# Patient Record
Sex: MALE | URBAN - METROPOLITAN AREA
[De-identification: names, ages, dates, MRNs, and addresses within clinical notes are randomized per-mention and may not be internally consistent; named-entity substitution may affect disease eponyms.]

---

## 2021-11-30 ENCOUNTER — OFFICE VISIT (OUTPATIENT)
Dept: URBAN - METROPOLITAN AREA CLINIC 8 | Facility: CLINIC | Age: 71
End: 2021-11-30
Payer: COMMERCIAL

## 2021-11-30 DIAGNOSIS — Z96.1 PRESENCE OF INTRAOCULAR LENS: ICD-10-CM

## 2021-11-30 DIAGNOSIS — E11.9 DIABETES MELLITUS TYPE 2 WITHOUT MENTION OF COMPLICATION: Primary | ICD-10-CM

## 2021-11-30 DIAGNOSIS — H04.123 DRY EYE SYNDROME OF BILATERAL LACRIMAL GLANDS: ICD-10-CM

## 2021-11-30 PROCEDURE — 99204 OFFICE O/P NEW MOD 45 MIN: CPT | Performed by: OPHTHALMOLOGY

## 2021-11-30 ASSESSMENT — INTRAOCULAR PRESSURE
OD: 13
OS: 12

## 2021-11-30 NOTE — IMPRESSION/PLAN
Impression: Diabetes mellitus Type 2 without mention of complication: O86.5. Plan: Best treatment is to keep sugar under good control.  Regular visits with PCP

 return in 1 year

## 2021-11-30 NOTE — IMPRESSION/PLAN
Impression: Dry eye syndrome of bilateral lacrimal glands: H04.123. Plan: Mild, advised patient of condition.  Artificial tears PRN ou

## 2023-10-27 ENCOUNTER — OFFICE VISIT (OUTPATIENT)
Dept: URBAN - METROPOLITAN AREA CLINIC 8 | Facility: CLINIC | Age: 73
End: 2023-10-27
Payer: COMMERCIAL

## 2023-10-27 DIAGNOSIS — H53.2 DIPLOPIA: Primary | ICD-10-CM

## 2023-10-27 DIAGNOSIS — E11.9 DIABETES MELLITUS TYPE 2 WITHOUT MENTION OF COMPLICATION: ICD-10-CM

## 2023-10-27 DIAGNOSIS — H04.123 DRY EYE SYNDROME OF BILATERAL LACRIMAL GLANDS: ICD-10-CM

## 2023-10-27 DIAGNOSIS — Z96.1 PRESENCE OF INTRAOCULAR LENS: ICD-10-CM

## 2023-10-27 PROCEDURE — 99204 OFFICE O/P NEW MOD 45 MIN: CPT

## 2023-10-27 ASSESSMENT — INTRAOCULAR PRESSURE
OD: 14
OS: 16

## 2023-10-27 ASSESSMENT — VISUAL ACUITY
OD: 20/40
OS: 20/40

## 2023-10-30 ENCOUNTER — OFFICE VISIT (OUTPATIENT)
Dept: URBAN - METROPOLITAN AREA CLINIC 8 | Facility: CLINIC | Age: 73
End: 2023-10-30
Payer: COMMERCIAL

## 2023-10-30 DIAGNOSIS — H53.2 DIPLOPIA: ICD-10-CM

## 2023-10-30 DIAGNOSIS — H52.13 MYOPIA, BILATERAL: Primary | ICD-10-CM

## 2023-10-30 ASSESSMENT — VISUAL ACUITY
OD: 20/50
OS: 20/40

## 2024-08-13 ENCOUNTER — OFFICE VISIT (OUTPATIENT)
Facility: LOCATION | Age: 74
End: 2024-08-13
Payer: COMMERCIAL

## 2024-08-13 DIAGNOSIS — H02.834 DERMATOCHALASIS OF LEFT UPPER EYELID: ICD-10-CM

## 2024-08-13 DIAGNOSIS — E11.9 TYPE II DIABETES MELLITUS WITHOUT COMPLICATION: Primary | ICD-10-CM

## 2024-08-13 DIAGNOSIS — H02.831 DERMATOCHALASIS OF RIGHT UPPER EYELID: ICD-10-CM

## 2024-08-13 DIAGNOSIS — H43.812 VITREOUS DEGENERATION, LEFT EYE: ICD-10-CM

## 2024-08-13 PROCEDURE — 92014 COMPRE OPH EXAM EST PT 1/>: CPT

## 2024-08-13 ASSESSMENT — VISUAL ACUITY
OD: 20/40
OS: 20/60

## 2024-08-13 ASSESSMENT — INTRAOCULAR PRESSURE
OS: 16
OD: 16

## 2024-10-03 ENCOUNTER — OFFICE VISIT (OUTPATIENT)
Dept: URBAN - METROPOLITAN AREA CLINIC 32 | Facility: CLINIC | Age: 74
End: 2024-10-03
Payer: COMMERCIAL

## 2024-10-03 DIAGNOSIS — H02.831 DERMATOCHALASIS OF RIGHT UPPER EYELID: Primary | ICD-10-CM

## 2024-10-03 DIAGNOSIS — H02.834 DERMATOCHALASIS OF LEFT UPPER EYELID: ICD-10-CM

## 2024-10-03 PROCEDURE — 92285 EXTERNAL OCULAR PHOTOGRAPHY: CPT | Performed by: OPHTHALMOLOGY

## 2024-10-03 PROCEDURE — 92004 COMPRE OPH EXAM NEW PT 1/>: CPT | Performed by: OPHTHALMOLOGY

## 2024-10-07 ENCOUNTER — TECH ONLY (OUTPATIENT)
Dept: URBAN - METROPOLITAN AREA CLINIC 32 | Facility: CLINIC | Age: 74
End: 2024-10-07
Payer: COMMERCIAL

## 2024-10-07 DIAGNOSIS — H02.831 DERMATOCHALASIS OF RIGHT UPPER EYELID: Primary | ICD-10-CM

## 2025-05-29 ENCOUNTER — OFFICE VISIT (OUTPATIENT)
Dept: URBAN - METROPOLITAN AREA CLINIC 32 | Facility: CLINIC | Age: 75
End: 2025-05-29
Payer: COMMERCIAL

## 2025-05-29 DIAGNOSIS — H02.834 DERMATOCHALASIS OF LEFT UPPER EYELID: ICD-10-CM

## 2025-05-29 DIAGNOSIS — H02.831 DERMATOCHALASIS OF RIGHT UPPER EYELID: Primary | ICD-10-CM

## 2025-05-29 PROCEDURE — 99214 OFFICE O/P EST MOD 30 MIN: CPT | Performed by: OPHTHALMOLOGY

## 2025-06-26 ENCOUNTER — POST-OPERATIVE VISIT (OUTPATIENT)
Dept: URBAN - METROPOLITAN AREA CLINIC 32 | Facility: CLINIC | Age: 75
End: 2025-06-26
Payer: MEDICARE

## 2025-06-26 DIAGNOSIS — Z48.89 ENCOUNTER FOR OTHER SPECIFIED SURGICAL AFTERCARE: Primary | ICD-10-CM

## 2025-06-26 PROCEDURE — 99024 POSTOP FOLLOW-UP VISIT: CPT | Performed by: OPHTHALMOLOGY

## 2025-08-13 ENCOUNTER — OFFICE VISIT (OUTPATIENT)
Facility: LOCATION | Age: 75
End: 2025-08-13
Payer: MEDICARE

## 2025-08-13 DIAGNOSIS — E11.9 TYPE II DIABETES MELLITUS WITHOUT COMPLICATION: Primary | ICD-10-CM

## 2025-08-13 DIAGNOSIS — H04.123 DRY EYE SYNDROME OF BILATERAL LACRIMAL GLANDS: ICD-10-CM

## 2025-08-13 DIAGNOSIS — H52.13 MYOPIA, BILATERAL: ICD-10-CM

## 2025-08-13 DIAGNOSIS — Z96.1 PRESENCE OF INTRAOCULAR LENS: ICD-10-CM

## 2025-08-13 DIAGNOSIS — H43.812 VITREOUS DEGENERATION, LEFT EYE: ICD-10-CM

## 2025-08-13 PROCEDURE — 92014 COMPRE OPH EXAM EST PT 1/>: CPT

## 2025-08-13 ASSESSMENT — VISUAL ACUITY
OS: 20/25
OD: 20/30

## 2025-08-13 ASSESSMENT — INTRAOCULAR PRESSURE
OD: 17
OS: 18